# Patient Record
Sex: FEMALE | Race: BLACK OR AFRICAN AMERICAN | URBAN - METROPOLITAN AREA
[De-identification: names, ages, dates, MRNs, and addresses within clinical notes are randomized per-mention and may not be internally consistent; named-entity substitution may affect disease eponyms.]

---

## 2021-09-13 NOTE — PATIENT DISCUSSION
PERSISTENT FRONTAL HEADACHES: OCULAR HEALTH APPEARS WNL. OPTIC NERVES HEALTHY / MACULA WNL. REFER BACK TO PCP FOR FURTHER EVAL.

## 2021-09-13 NOTE — PATIENT DISCUSSION
New Prescription: erythromycin (erythromycin): ointment: 5 mg/gram (0.5 %) 1 a small amount at bedtime on eyelid 09-

## 2021-09-13 NOTE — PATIENT DISCUSSION
BLEPHARITIS, OU: PRESCRIBE WARM COMPRESSES AND EYELID SCRUBS QD-BID, ARTIFICIAL TEARS BID-QID. PRESCRIBED ERYTHROMYIN BETY QHS OU. RETURN FOR FOLLOW-UP AS SCHEDULED.

## 2022-02-10 NOTE — PATIENT DISCUSSION
Lens Treatment: Acitretin Pregnancy And Lactation Text: This medication is Pregnancy Category X and should not be given to women who are pregnant or may become pregnant in the future. This medication is excreted in breast milk.

## 2022-03-01 ENCOUNTER — NEW PATIENT (OUTPATIENT)
Dept: URBAN - METROPOLITAN AREA CLINIC 9 | Facility: CLINIC | Age: 67
End: 2022-03-01

## 2022-03-01 DIAGNOSIS — H52.223: ICD-10-CM

## 2022-03-01 DIAGNOSIS — H25.13: ICD-10-CM

## 2022-03-01 PROCEDURE — 99204 OFFICE O/P NEW MOD 45 MIN: CPT

## 2022-03-01 PROCEDURE — 92136 OPHTHALMIC BIOMETRY: CPT

## 2022-03-01 PROCEDURE — 92134 CPTRZ OPH DX IMG PST SGM RTA: CPT

## 2022-03-01 PROCEDURE — 92015 DETERMINE REFRACTIVE STATE: CPT

## 2022-03-01 PROCEDURE — 92133 CPTRZD OPH DX IMG PST SGM ON: CPT

## 2022-03-01 ASSESSMENT — VISUAL ACUITY
OS_SC: 20/70-1
OD_GLARE: 20/40
OD_SC: 20/80
OU_SC: 20/70
OS_GLARE: 20/50+1

## 2022-03-01 ASSESSMENT — TONOMETRY
OD_IOP_MMHG: 16
OS_IOP_MMHG: 16

## 2022-03-15 PROBLEM — Z96.1: Noted: 2022-03-15

## 2022-03-16 ENCOUNTER — POST-OP (OUTPATIENT)
Dept: URBAN - METROPOLITAN AREA CLINIC 9 | Facility: CLINIC | Age: 67
End: 2022-03-16

## 2022-03-16 DIAGNOSIS — Z96.1: ICD-10-CM

## 2022-03-16 PROCEDURE — 99024 POSTOP FOLLOW-UP VISIT: CPT

## 2022-03-16 ASSESSMENT — VISUAL ACUITY
OU_SC: 20/25-2
OD_SC: 20/20-1

## 2022-03-16 ASSESSMENT — TONOMETRY: OD_IOP_MMHG: 22

## 2022-03-23 ENCOUNTER — POST OP/EVAL OF SECOND EYE (OUTPATIENT)
Dept: URBAN - METROPOLITAN AREA CLINIC 9 | Facility: CLINIC | Age: 67
End: 2022-03-23

## 2022-03-23 DIAGNOSIS — H52.223: ICD-10-CM

## 2022-03-23 DIAGNOSIS — Z96.1: ICD-10-CM

## 2022-03-23 DIAGNOSIS — H25.12: ICD-10-CM

## 2022-03-23 PROCEDURE — 99024 POSTOP FOLLOW-UP VISIT: CPT

## 2022-03-23 PROCEDURE — 92136 OPHTHALMIC BIOMETRY: CPT

## 2022-03-23 ASSESSMENT — VISUAL ACUITY
OU_SC: 20/30-2
OD_SC: 20/30-2

## 2022-03-23 ASSESSMENT — KERATOMETRY
OD_K2POWER_DIOPTERS: 42.75
OD_AXISANGLE2_DEGREES: 133
OD_K1POWER_DIOPTERS: 42.50
OD_AXISANGLE_DEGREES: 43

## 2022-03-23 ASSESSMENT — TONOMETRY: OD_IOP_MMHG: 20

## 2022-04-20 ENCOUNTER — POST-OP (OUTPATIENT)
Dept: URBAN - METROPOLITAN AREA CLINIC 9 | Facility: CLINIC | Age: 67
End: 2022-04-20

## 2022-04-20 DIAGNOSIS — Z96.1: ICD-10-CM

## 2022-04-20 PROCEDURE — 99024 POSTOP FOLLOW-UP VISIT: CPT

## 2022-04-20 ASSESSMENT — TONOMETRY: OS_IOP_MMHG: 31

## 2022-04-20 ASSESSMENT — VISUAL ACUITY
OS_SC: 20/30-2
OU_SC: 20/30-2

## 2022-05-03 ENCOUNTER — POST-OP (OUTPATIENT)
Dept: URBAN - METROPOLITAN AREA CLINIC 9 | Facility: CLINIC | Age: 67
End: 2022-05-03

## 2022-05-03 DIAGNOSIS — Z96.1: ICD-10-CM

## 2022-05-03 PROCEDURE — 99024 POSTOP FOLLOW-UP VISIT: CPT

## 2022-05-03 ASSESSMENT — VISUAL ACUITY
OU_SC: 20/20
OD_SC: 20/25
OS_SC: 20/25

## 2022-05-03 ASSESSMENT — KERATOMETRY
OS_K2POWER_DIOPTERS: 43.75
OD_AXISANGLE_DEGREES: 0
OS_AXISANGLE2_DEGREES: 32
OD_AXISANGLE2_DEGREES: 90
OS_K1POWER_DIOPTERS: 43.25
OD_K1POWER_DIOPTERS: 43.00
OD_K2POWER_DIOPTERS: 43.00
OS_AXISANGLE_DEGREES: 122

## 2022-05-03 ASSESSMENT — TONOMETRY
OD_IOP_MMHG: 14
OS_IOP_MMHG: 13

## 2022-05-17 ENCOUNTER — POST-OP (OUTPATIENT)
Dept: URBAN - METROPOLITAN AREA CLINIC 9 | Facility: CLINIC | Age: 67
End: 2022-05-17

## 2022-05-17 DIAGNOSIS — Z96.1: ICD-10-CM

## 2022-05-17 DIAGNOSIS — H20.043: ICD-10-CM

## 2022-05-17 PROCEDURE — 99024 POSTOP FOLLOW-UP VISIT: CPT

## 2022-05-17 ASSESSMENT — KERATOMETRY
OD_AXISANGLE2_DEGREES: 90
OS_K1POWER_DIOPTERS: 43.25
OS_AXISANGLE_DEGREES: 122
OS_AXISANGLE2_DEGREES: 32
OD_AXISANGLE_DEGREES: 0
OD_K2POWER_DIOPTERS: 43.00
OD_K1POWER_DIOPTERS: 43.00
OS_K2POWER_DIOPTERS: 43.75

## 2022-05-17 ASSESSMENT — VISUAL ACUITY
OU_SC: 20/20-2
OS_SC: 20/20-2
OD_SC: 20/25+1

## 2022-05-17 ASSESSMENT — TONOMETRY
OD_IOP_MMHG: 13
OS_IOP_MMHG: 14

## 2022-07-08 RX ORDER — BUSPIRONE HYDROCHLORIDE 7.5 MG/1
TABLET ORAL
COMMUNITY
Start: 2021-09-10

## 2022-07-08 RX ORDER — ACYCLOVIR 400 MG/1
TABLET ORAL
COMMUNITY

## 2022-07-08 RX ORDER — AMLODIPINE BESYLATE 5 MG/1
TABLET ORAL
COMMUNITY

## 2022-07-08 RX ORDER — CHLORTHALIDONE 25 MG/1
TABLET ORAL
COMMUNITY
Start: 2022-01-21 | End: 2022-09-01

## 2022-07-08 RX ORDER — PANTOPRAZOLE SODIUM 40 MG/1
TABLET, DELAYED RELEASE ORAL
COMMUNITY

## 2022-07-08 RX ORDER — HYDROXYZINE HYDROCHLORIDE 25 MG/1
TABLET, FILM COATED ORAL
COMMUNITY

## 2022-07-08 RX ORDER — TRAZODONE HYDROCHLORIDE 50 MG/1
TABLET ORAL
COMMUNITY
Start: 2021-09-10

## 2022-07-08 RX ORDER — GABAPENTIN 100 MG/1
1 CAPSULE ORAL
COMMUNITY

## 2022-08-04 ENCOUNTER — ESTABLISHED PATIENT (OUTPATIENT)
Dept: URBAN - METROPOLITAN AREA CLINIC 9 | Facility: CLINIC | Age: 67
End: 2022-08-04

## 2022-08-04 DIAGNOSIS — H15.012: ICD-10-CM

## 2022-08-04 PROCEDURE — 92014 COMPRE OPH EXAM EST PT 1/>: CPT

## 2022-08-04 RX ORDER — PREDNISOLONE ACETATE 10 MG/ML
1 SUSPENSION/ DROPS OPHTHALMIC
Start: 2022-08-04

## 2022-08-04 RX ORDER — IBUPROFEN 600 MG/1
1 TABLET, FILM COATED ORAL
Start: 2022-08-04

## 2022-08-04 ASSESSMENT — VISUAL ACUITY
OD_PH: 20/25+1
OD_SC: 20/30-2
OS_PH: 20/25+2
OS_SC: 20/30-1

## 2022-08-04 ASSESSMENT — TONOMETRY
OS_IOP_MMHG: 12
OD_IOP_MMHG: 12

## 2022-11-03 PROBLEM — K57.32 DIVERTICULITIS OF SIGMOID COLON: Status: ACTIVE | Noted: 2022-11-03

## 2023-03-05 NOTE — PATIENT DISCUSSION
DRY EYE SYNDROME OU: RX ARTIFICIAL TEARS AS NEEDED TO INCREASE COMFORT OU. IF SYMPTOMS PERSIST CONSIDER PUNCTAL PLUGS. ,